# Patient Record
Sex: FEMALE | Race: BLACK OR AFRICAN AMERICAN
[De-identification: names, ages, dates, MRNs, and addresses within clinical notes are randomized per-mention and may not be internally consistent; named-entity substitution may affect disease eponyms.]

---

## 2019-12-27 ENCOUNTER — HOSPITAL ENCOUNTER (EMERGENCY)
Dept: HOSPITAL 72 - EMR | Age: 34
Discharge: HOME | End: 2019-12-27
Payer: COMMERCIAL

## 2019-12-27 VITALS — BODY MASS INDEX: 39.68 KG/M2 | HEIGHT: 72 IN | WEIGHT: 293 LBS

## 2019-12-27 VITALS — SYSTOLIC BLOOD PRESSURE: 126 MMHG | DIASTOLIC BLOOD PRESSURE: 71 MMHG

## 2019-12-27 VITALS — SYSTOLIC BLOOD PRESSURE: 124 MMHG | DIASTOLIC BLOOD PRESSURE: 80 MMHG

## 2019-12-27 DIAGNOSIS — J04.0: ICD-10-CM

## 2019-12-27 DIAGNOSIS — J06.9: Primary | ICD-10-CM

## 2019-12-27 PROCEDURE — 99282 EMERGENCY DEPT VISIT SF MDM: CPT

## 2019-12-27 NOTE — EMERGENCY ROOM REPORT
History of Present Illness


General


Chief Complaint:  Upper Respiratory Illness


Source:  Patient





Present Illness


HPI


34-year-old female presents with cough, congestion, no fevers no chills, states 

that she lost her voice 3 days ago, she states that the cough and congestion 

has been ongoing for about 1.5 weeks she is slowly improved and she feels fine 

she does endorse some sore throat aggravated by swallowing alleviated with rest

, severity is mild patient is wondering what is going on patient feels fine 

currently patient presents for evaluation


Allergies:  


Coded Allergies:  


     No Known Allergies (Unverified , 8/28/13)





Patient History


Past Medical History:  see triage record


Last Menstrual Period:  12/14/19


Pregnant Now:  No


Reviewed Nursing Documentation:  PMH: Agreed; PSxH: Agreed





Nursing Documentation-PMH


Past Medical History:  No Stated History





Review of Systems


All Other Systems:  negative except mentioned in HPI





Physical Exam





Vital Signs








  Date Time  Temp Pulse Resp B/P (MAP) Pulse Ox O2 Delivery O2 Flow Rate FiO2


 


12/27/19 13:45 97.3 104 14 124/80 (95) 89 Room Air  








Sp02 EP Interpretation:  reviewed, normal


General Appearance:  well appearing, no apparent distress, alert


Head:  normocephalic, atraumatic


Eyes:  bilateral eye PERRL, bilateral eye EOMI


ENT:  uvula midline, moist mucus membranes, nasal congestion, pharyngeal 

erythema


Neck:  supple, thyroid normal, supple/symm/no masses


Respiratory:  lungs clear, no respiratory distress, no retraction, no accessory 

muscle use


Cardiovascular #1:  normal peripheral pulses, regular rate, rhythm, no edema, 

no gallop, no murmur


Gastrointestinal:  non tender, soft, no guarding, no rebound


Musculoskeletal:  normal inspection


Neurologic:  alert, oriented x3


Psychiatric:  mood/affect normal


Skin:  no rash, warm/dry





Medical Decision Making


Diagnostic Impression:  


 Primary Impression:  


 Upper respiratory infection


 Qualified Codes:  J06.9 - Acute upper respiratory infection, unspecified


 Additional Impression:  


 Laryngitis


ER Course


34-year-old female presents most likely with a laryngitis differential 

diagnosis includes URI, pharyngitis


Symptomatic care no indications for x-ray


Disposition home with return precautions





Last Vital Signs








  Date Time  Temp Pulse Resp B/P (MAP) Pulse Ox O2 Delivery O2 Flow Rate FiO2


 


12/27/19 14:27 97.3 89 14 124/80 89 Room Air  








Disposition:  HOME, SELF-CARE


Condition:  Stable


Scripts


Guaifenesin/Codeine Phosphate (GUAIFENESIN AC COUGH SYRUP) 473 Ml Liquid


1 TSP ORAL Q8H PRN for For Cough, #473 ML 0 Refills


   Prov: Jose Rose MD         12/27/19 


Benzonatate* (TESSALON PERLE*) 100 Mg Capsule


100 MG ORAL THREE TIMES A DAY PRN for For Cough, #15 PERLE


   Prov: Jose Rose MD         12/27/19


Referrals:  


[facility]


Patient Instructions:  Laryngitis, Easy-to-Read, Upper Respiratory Infection, 

Adult





Additional Instructions:  


The patient was provided with discharge instructions, notified to follow-up 

with a primary care doctor and or specialist in the next 24-48 hours, and to 

return to the ED if they have worsening of their symptoms. 





Please note that this report is being documented using DRAGON technology.


This can lead to erroneous entry secondary to incorrect interpretation by the 

dictating instrument.











Jose Rose MD Dec 27, 2019 14:37

## 2020-09-09 ENCOUNTER — HOSPITAL ENCOUNTER (EMERGENCY)
Dept: HOSPITAL 72 - EMR | Age: 35
Discharge: HOME | End: 2020-09-09
Payer: COMMERCIAL

## 2020-09-09 VITALS — SYSTOLIC BLOOD PRESSURE: 101 MMHG | DIASTOLIC BLOOD PRESSURE: 71 MMHG

## 2020-09-09 VITALS — WEIGHT: 293 LBS | BODY MASS INDEX: 39.68 KG/M2 | HEIGHT: 72 IN

## 2020-09-09 DIAGNOSIS — H92.01: ICD-10-CM

## 2020-09-09 DIAGNOSIS — H60.91: ICD-10-CM

## 2020-09-09 DIAGNOSIS — J02.0: Primary | ICD-10-CM

## 2020-09-09 PROCEDURE — 99282 EMERGENCY DEPT VISIT SF MDM: CPT

## 2020-09-09 NOTE — EMERGENCY ROOM REPORT
History of Present Illness


General


Chief Complaint:  Earache


Source:  Patient





Present Illness


HPI


35-year-old female with no known segment past medical history here complaining 

of 3 days of right-sided throat pain and swelling and 1 day of right-sided 

earache and sinus congestion.  Denies any fever and chills and cough.  Denies 

nausea vomiting diarrhea.Has not taken medication for symptom relief.  Denies 

any water exposure.  Reports that she feels muffled inside right ear.  Denies 

any vertigo, dizziness, hearing loss, tinnitus.  Denies any head injury.  

Denies any pus drainage or bleeding to the ER.  Has not taken medication for 

symptom relief.  Reports that for the past week she has been sleeping in front 

of a blowing air conditioning.  Denies pregnancy.


Allergies:  


Coded Allergies:  


     No Known Allergies (Unverified , 8/28/13)





COVID-19 Screening


Contact w/high risk pt:  No


Experienced COVID-19 symptoms?:  No


COVID-19 Testing performed PTA:  No





Patient History


Past Medical History:  see triage record


Past Surgical History:  none


Pertinent Family History:  none


Last Menstrual Period:  08/13/2020


Pregnant Now:  No


Immunizations:  UTD


Reviewed Nursing Documentation:  PMH: Agreed; PSxH: Agreed





Nursing Documentation-PMH


Past Medical History:  No Stated History





Review of Systems


All Other Systems:  negative except mentioned in HPI





Physical Exam





Vital Signs








  Date Time  Temp Pulse Resp B/P (MAP) Pulse Ox O2 Delivery O2 Flow Rate FiO2


 


9/9/20 14:55 97.5 91 15 101/71 (81) 100 Room Air  








Sp02 EP Interpretation:  reviewed, normal


General Appearance:  normal inspection


Head:  normocephalic, atraumatic


Eyes:  bilateral eye normal inspection, bilateral eye PERRL


ENT:  TMs + canals normal, nasal congestion, tonsillar swelling, pharyngeal 

erythema, tonsillar exudate, other - Right ear canal erythematous and tragus 

tender to palpation


Neck:  full range of motion, supple/symm/no masses


Respiratory:  chest non-tender, lungs clear, normal breath sounds, speaking 

full sentences


Cardiovascular #1:  regular rate, rhythm, no edema


Rectal:  deferred


Musculoskeletal:  back normal


Neurologic:  alert, motor strength/tone normal, oriented x3, sensory intact, 

responsive, speech normal


Psychiatric:  judgement/insight normal, memory normal, mood/affect normal, no 

suicidal/homicidal ideation


Skin:  no rash


Lymphatic:  adenopathy - Right anterior cervical lymphadenopathy





Medical Decision Making


PA Attestation


All diagnosis and treatment plans were discussed and reviewed by my supervising 

physician Dr. Dee


Diagnostic Impression:  


 Primary Impression:  


 Strep pharyngitis


 Additional Impressions:  


 Otalgia


 Otitis externa


ER Course


35-year-old female with no known segment past medical history here complaining 

of 3 days of right-sided throat pain and swelling and 1 day of right-sided 

earache and sinus congestion.  Denies any fever and chills and cough.  Denies 

nausea vomiting diarrhea.Has not taken medication for symptom relief.  Denies 

any water exposure.  Reports that she feels muffled inside right ear.  Denies 

any vertigo, dizziness, hearing loss, tinnitus.  Denies any head injury.  

Denies any pus drainage or bleeding to the ER.  Has not taken medication for 

symptom relief.  Reports that for the past week she has been sleeping in front 

of a blowing air conditioning.  Denies pregnancy.





Ddx considered but are not limited to: strep pharyngitis, URI, tonsillitis, 

peritonsillar abscess, influenza, otitis media, otitis externa, otalgia














Vital signs: are WNL, pt. is afebrile








 H&PE are most consistent with: Otitis externa, strep pharyngitis, otalgia














ORDERS: Augmentin, ofloxacin otic, Motrin











ED INTERVENTIONS: None required at this time.























DISCHARGE: At this time pt. is stable for d/c to home. Will provide printed 

patient care instructions, and any necessary prescriptions. Care plan and 

follow up instructions have been discussed with the patient prior to discharge.

  Patient take medication as directed, follow primary care provider, worsening 

symptoms return to the emergency room





Last Vital Signs








  Date Time  Temp Pulse Resp B/P (MAP) Pulse Ox O2 Delivery O2 Flow Rate FiO2


 


9/9/20 14:55 97.5 91 15 101/71 (81) 100 Room Air  








Disposition:  HOME, SELF-CARE


Condition:  Stable


Scripts


Ibuprofen* (MOTRIN*) 600 Mg Tablet


600 MG ORAL THREE TIMES A DAY, #30 TAB


   Prov: Vangie Wilson         9/9/20 


Ofloxacin (OFLOXACIN) 5 Ml Drops


5 DROP OT DAILY for 7 Days, #5 ML


   Prov: Vangie Wilson         9/9/20 


Amoxicillin/Potassium Clav 875-125* (AUGMENTIN 875-125 TABLET*) 1 Each Tablet


1 TAB ORAL TWICE A DAY for 10 Days, #20 TAB


   Prov: Vangie Wilson         9/9/20


Patient Instructions:  Earache, Pharyngitis, Easy-to-Read





Additional Instructions:  


Take medication as directed, follow-up with your primary care provider, 

worsening symptoms return to the emergency room











Vangie Wilson Sep 9, 2020 15:07

## 2020-12-16 ENCOUNTER — HOSPITAL ENCOUNTER (EMERGENCY)
Dept: HOSPITAL 72 - EMR | Age: 35
Discharge: HOME | End: 2020-12-16
Payer: COMMERCIAL

## 2020-12-16 VITALS — SYSTOLIC BLOOD PRESSURE: 113 MMHG | DIASTOLIC BLOOD PRESSURE: 74 MMHG

## 2020-12-16 VITALS — DIASTOLIC BLOOD PRESSURE: 74 MMHG | SYSTOLIC BLOOD PRESSURE: 113 MMHG

## 2020-12-16 VITALS — WEIGHT: 293 LBS | HEIGHT: 71 IN | BODY MASS INDEX: 41.02 KG/M2

## 2020-12-16 DIAGNOSIS — J34.89: Primary | ICD-10-CM

## 2020-12-16 PROCEDURE — 99282 EMERGENCY DEPT VISIT SF MDM: CPT

## 2020-12-16 NOTE — NUR
ED Nurse Note:



Pt walked in to ED from home c/o bilateral earache x1 week. Denies ear 
discharges/ difficulty hearing. AAOx4, no SOB. No fever.

## 2020-12-16 NOTE — EMERGENCY ROOM REPORT
History of Present Illness


General


Chief Complaint:  Earache


Source:  Patient





Present Illness


HPI


35-year-old female with no significant past medical history here complaining of 

bilateral earache and sinus pressure x1 week.  Denies any sore throat, cough and

congestion.  Denies fever and chills, loss of taste and smell, diarrhea.  

Frontal sinuses tender to palpation.  Has not taken medication for symptom 

relief.  Denies pregnancy.


Allergies:  


Coded Allergies:  


     No Known Allergies (Unverified , 8/28/13)





COVID-19 Screening


Contact w/high risk pt:  No


Experienced COVID-19 symptoms?:  No


COVID-19 Testing performed PTA:  No





Patient History


Past Medical History:  see triage record


Past Surgical History:  none


Pertinent Family History:  none


Last Menstrual Period:  11/28/20


Pregnant Now:  No


Immunizations:  UTD


Reviewed Nursing Documentation:  PMH: Agreed; PSxH: Agreed





Nursing Documentation-PMH


Past Medical History:  No Stated History





Review of Systems


All Other Systems:  negative except mentioned in HPI





Physical Exam





Vital Signs








  Date Time  Temp Pulse Resp B/P (MAP) Pulse Ox O2 Delivery O2 Flow Rate FiO2


 


12/16/20 14:19 98.2 98 20 113/74 (87) 98 Room Air  








Sp02 EP Interpretation:  reviewed, normal


General Appearance:  no apparent distress, alert, GCS 15, non-toxic


Head:  normocephalic, atraumatic


Eyes:  bilateral eye normal inspection, bilateral eye PERRL


ENT:  EOM grossly intact, normal pharynx, TMs + canals normal, other - Frontal 

sinuses tender to palpation


Respiratory:  no retraction, no accessory muscle use, speaking full sentences


Cardiovascular #1:  regular rate, rhythm, no edema


Gastrointestinal:  soft


Genitourinary:  no CVA tenderness


Musculoskeletal:  back normal


Neurologic:  alert, motor strength/tone normal, oriented x3, sensory intact, 

responsive, speech normal


Psychiatric:  judgement/insight normal, memory normal, mood/affect normal, no 

suicidal/homicidal ideation


Skin:  no rash


Lymphatic:  no adenopathy





Medical Decision Making


PA Attestation


All my diagnosis and treatment plans were reviewed ad discussed with my 

supervising physician Dr. Buck


Diagnostic Impression:  


   Primary Impression:  


   Sinus pressure


ER Course


35-year-old female with no significant past medical history here complaining of 

bilateral earache and sinus pressure x1 week.  Denies any sore throat, cough and

congestion.  Denies fever and chills, loss of taste and smell, diarrhea.  

Frontal sinuses tender to palpation.  Has not taken medication for symptom reli

ef.  Denies pregnancy.





Ddx considered but are not limited to: strep pharyngitis, URI, tonsillitis, 

peritonsillar abscess, influneza














Vital signs: are WNL, pt. is afebrile








 H&PE are most consistent with: Sinus infection














ORDERS: Azithromycin, prednisone








ED INTERVENTIONS: None required at this time.























DISCHARGE: At this time pt. is stable for d/c to home. Will provide printed 

patient care instructions, and any necessary prescriptions. Care plan and follow

up instructions have been discussed with the patient prior to discharge.  

Advised patient to get tested for Covid, take medication as directed, if 

worsening symptoms return to emergency room





Last Vital Signs








  Date Time  Temp Pulse Resp B/P (MAP) Pulse Ox O2 Delivery O2 Flow Rate FiO2


 


12/16/20 14:25 98.2 98 20 113/74 98 Room Air  








Disposition:  HOME, SELF-CARE


Condition:  Stable


Scripts


Prednisone* (PREDNISONE*) 20 Mg Tablet


40 MG ORAL DAILY for 5 Days, #10 TAB


   Prov: Vangie Wilson         12/16/20 


Azithromycin* (ZITHROMAX*) 250 Mg Tablet


250 MG ORAL DAILY, #6 TAB 0 Refills


   Take two tables once daily for 1 day, then one tablet once daily for 4


   days.


   Prov: Vangie Wilson         12/16/20


Patient Instructions:  Sinusitis, Adult











Vangie Wilson      Dec 16, 2020 14:45